# Patient Record
Sex: MALE | Race: WHITE | NOT HISPANIC OR LATINO | Employment: OTHER | URBAN - METROPOLITAN AREA
[De-identification: names, ages, dates, MRNs, and addresses within clinical notes are randomized per-mention and may not be internally consistent; named-entity substitution may affect disease eponyms.]

---

## 2019-06-05 ENCOUNTER — HOSPITAL ENCOUNTER (EMERGENCY)
Facility: OTHER | Age: 44
Discharge: HOME | End: 2019-06-05
Attending: EMERGENCY MEDICINE
Payer: COMMERCIAL

## 2019-06-05 ENCOUNTER — OFFICE VISIT (OUTPATIENT)
Dept: URGENT CARE | Facility: CLINIC | Age: 44
End: 2019-06-05
Payer: COMMERCIAL

## 2019-06-05 VITALS
DIASTOLIC BLOOD PRESSURE: 94 MMHG | HEIGHT: 72 IN | RESPIRATION RATE: 14 BRPM | TEMPERATURE: 98 F | HEART RATE: 66 BPM | WEIGHT: 235 LBS | SYSTOLIC BLOOD PRESSURE: 178 MMHG | OXYGEN SATURATION: 96 % | BODY MASS INDEX: 31.83 KG/M2

## 2019-06-05 VITALS
RESPIRATION RATE: 20 BRPM | HEIGHT: 72 IN | HEART RATE: 59 BPM | DIASTOLIC BLOOD PRESSURE: 96 MMHG | TEMPERATURE: 98 F | OXYGEN SATURATION: 98 % | WEIGHT: 235 LBS | SYSTOLIC BLOOD PRESSURE: 175 MMHG | BODY MASS INDEX: 31.83 KG/M2

## 2019-06-05 DIAGNOSIS — M54.9 BACK PAIN, UNSPECIFIED BACK LOCATION, UNSPECIFIED BACK PAIN LATERALITY, UNSPECIFIED CHRONICITY: Primary | ICD-10-CM

## 2019-06-05 DIAGNOSIS — R10.32 LEFT LOWER QUADRANT PAIN: ICD-10-CM

## 2019-06-05 DIAGNOSIS — M54.50 LEFT-SIDED LOW BACK PAIN WITHOUT SCIATICA, UNSPECIFIED CHRONICITY: Primary | ICD-10-CM

## 2019-06-05 LAB
ALBUMIN SERPL BCP-MCNC: 4.2 G/DL (ref 3.5–5.2)
ALP SERPL-CCNC: 73 U/L (ref 55–135)
ALT SERPL W/O P-5'-P-CCNC: 152 U/L (ref 10–44)
ANION GAP SERPL CALC-SCNC: 9 MMOL/L (ref 8–16)
AST SERPL-CCNC: 97 U/L (ref 10–40)
BASOPHILS # BLD AUTO: 0.03 K/UL (ref 0–0.2)
BASOPHILS NFR BLD: 0.6 % (ref 0–1.9)
BILIRUB SERPL-MCNC: 0.8 MG/DL (ref 0.1–1)
BILIRUB UR QL STRIP: NEGATIVE
BUN SERPL-MCNC: 10 MG/DL (ref 6–20)
CALCIUM SERPL-MCNC: 9.1 MG/DL (ref 8.7–10.5)
CHLORIDE SERPL-SCNC: 105 MMOL/L (ref 95–110)
CO2 SERPL-SCNC: 26 MMOL/L (ref 23–29)
CREAT SERPL-MCNC: 1 MG/DL (ref 0.5–1.4)
DIFFERENTIAL METHOD: ABNORMAL
EOSINOPHIL # BLD AUTO: 0.1 K/UL (ref 0–0.5)
EOSINOPHIL NFR BLD: 2.1 % (ref 0–8)
ERYTHROCYTE [DISTWIDTH] IN BLOOD BY AUTOMATED COUNT: 15.9 % (ref 11.5–14.5)
EST. GFR  (AFRICAN AMERICAN): >60 ML/MIN/1.73 M^2
EST. GFR  (NON AFRICAN AMERICAN): >60 ML/MIN/1.73 M^2
GLUCOSE SERPL-MCNC: 88 MG/DL (ref 70–110)
GLUCOSE UR QL STRIP: NEGATIVE
HCT VFR BLD AUTO: 47.3 % (ref 40–54)
HGB BLD-MCNC: 16.2 G/DL (ref 14–18)
KETONES UR QL STRIP: NEGATIVE
LEUKOCYTE ESTERASE UR QL STRIP: NEGATIVE
LIPASE SERPL-CCNC: 15 U/L (ref 4–60)
LYMPHOCYTES # BLD AUTO: 1.6 K/UL (ref 1–4.8)
LYMPHOCYTES NFR BLD: 31.4 % (ref 18–48)
MCH RBC QN AUTO: 30.9 PG (ref 27–31)
MCHC RBC AUTO-ENTMCNC: 34.2 G/DL (ref 32–36)
MCV RBC AUTO: 90 FL (ref 82–98)
MONOCYTES # BLD AUTO: 0.6 K/UL (ref 0.3–1)
MONOCYTES NFR BLD: 10.9 % (ref 4–15)
NEUTROPHILS # BLD AUTO: 2.9 K/UL (ref 1.8–7.7)
NEUTROPHILS NFR BLD: 54.8 % (ref 38–73)
PH, POC UA: 5.5
PLATELET # BLD AUTO: 137 K/UL (ref 150–350)
PMV BLD AUTO: 11.2 FL (ref 9.2–12.9)
POC BLOOD, URINE: NEGATIVE
POC NITRATES, URINE: NEGATIVE
POTASSIUM SERPL-SCNC: 4 MMOL/L (ref 3.5–5.1)
PROT SERPL-MCNC: 7.3 G/DL (ref 6–8.4)
PROT UR QL STRIP: NEGATIVE
RBC # BLD AUTO: 5.24 M/UL (ref 4.6–6.2)
SODIUM SERPL-SCNC: 140 MMOL/L (ref 136–145)
SP GR UR STRIP: 1.02 (ref 1–1.03)
UROBILINOGEN UR STRIP-ACNC: NORMAL (ref 0.3–2.2)
WBC # BLD AUTO: 5.23 K/UL (ref 3.9–12.7)

## 2019-06-05 PROCEDURE — 99284 EMERGENCY DEPT VISIT MOD MDM: CPT | Mod: 25

## 2019-06-05 PROCEDURE — 85025 COMPLETE CBC W/AUTO DIFF WBC: CPT

## 2019-06-05 PROCEDURE — 99203 OFFICE O/P NEW LOW 30 MIN: CPT | Mod: 25,S$GLB,, | Performed by: NURSE PRACTITIONER

## 2019-06-05 PROCEDURE — 96372 THER/PROPH/DIAG INJ SC/IM: CPT | Mod: S$GLB,,, | Performed by: EMERGENCY MEDICINE

## 2019-06-05 PROCEDURE — 96374 THER/PROPH/DIAG INJ IV PUSH: CPT

## 2019-06-05 PROCEDURE — 96361 HYDRATE IV INFUSION ADD-ON: CPT

## 2019-06-05 PROCEDURE — 96375 TX/PRO/DX INJ NEW DRUG ADDON: CPT

## 2019-06-05 PROCEDURE — 25000003 PHARM REV CODE 250: Performed by: PHYSICIAN ASSISTANT

## 2019-06-05 PROCEDURE — 83690 ASSAY OF LIPASE: CPT

## 2019-06-05 PROCEDURE — 63600175 PHARM REV CODE 636 W HCPCS: Performed by: PHYSICIAN ASSISTANT

## 2019-06-05 PROCEDURE — 96372 PR INJECTION,THERAP/PROPH/DIAG2ST, IM OR SUBCUT: ICD-10-PCS | Mod: S$GLB,,, | Performed by: EMERGENCY MEDICINE

## 2019-06-05 PROCEDURE — 80053 COMPREHEN METABOLIC PANEL: CPT

## 2019-06-05 PROCEDURE — 74019 RADEX ABDOMEN 2 VIEWS: CPT | Mod: FY,S$GLB,, | Performed by: RADIOLOGY

## 2019-06-05 PROCEDURE — 81003 URINALYSIS AUTO W/O SCOPE: CPT | Mod: QW,S$GLB,, | Performed by: NURSE PRACTITIONER

## 2019-06-05 PROCEDURE — 81003 POCT URINALYSIS, DIPSTICK, AUTOMATED, W/O SCOPE: ICD-10-PCS | Mod: QW,S$GLB,, | Performed by: NURSE PRACTITIONER

## 2019-06-05 PROCEDURE — 99203 PR OFFICE/OUTPT VISIT, NEW, LEVL III, 30-44 MIN: ICD-10-PCS | Mod: 25,S$GLB,, | Performed by: NURSE PRACTITIONER

## 2019-06-05 PROCEDURE — 25500020 PHARM REV CODE 255: Performed by: EMERGENCY MEDICINE

## 2019-06-05 PROCEDURE — 74019 XR ABDOMEN FLAT AND ERECT: ICD-10-PCS | Mod: FY,S$GLB,, | Performed by: RADIOLOGY

## 2019-06-05 RX ORDER — KETOROLAC TROMETHAMINE 30 MG/ML
30 INJECTION, SOLUTION INTRAMUSCULAR; INTRAVENOUS
Status: COMPLETED | OUTPATIENT
Start: 2019-06-05 | End: 2019-06-05

## 2019-06-05 RX ORDER — DIPHENHYDRAMINE HYDROCHLORIDE 50 MG/ML
50 INJECTION INTRAMUSCULAR; INTRAVENOUS
Status: COMPLETED | OUTPATIENT
Start: 2019-06-05 | End: 2019-06-05

## 2019-06-05 RX ORDER — ONDANSETRON 4 MG/1
4 TABLET, ORALLY DISINTEGRATING ORAL EVERY 6 HOURS PRN
Qty: 15 TABLET | Refills: 0 | Status: SHIPPED | OUTPATIENT
Start: 2019-06-05

## 2019-06-05 RX ORDER — CERTOLIZUMAB PEGOL 200 MG/ML
INJECTION, SOLUTION SUBCUTANEOUS
COMMUNITY
Start: 2019-06-04

## 2019-06-05 RX ORDER — NAPROXEN 500 MG/1
500 TABLET ORAL 2 TIMES DAILY WITH MEALS
Qty: 20 TABLET | Refills: 0 | Status: SHIPPED | OUTPATIENT
Start: 2019-06-05

## 2019-06-05 RX ORDER — ESCITALOPRAM OXALATE 10 MG/1
TABLET ORAL
Refills: 3 | COMMUNITY
Start: 2019-05-18

## 2019-06-05 RX ORDER — NEBIVOLOL HYDROCHLORIDE 5 MG/1
TABLET ORAL
Refills: 0 | COMMUNITY
Start: 2019-05-09

## 2019-06-05 RX ORDER — ONDANSETRON 2 MG/ML
4 INJECTION INTRAMUSCULAR; INTRAVENOUS
Status: COMPLETED | OUTPATIENT
Start: 2019-06-05 | End: 2019-06-05

## 2019-06-05 RX ORDER — TELMISARTAN 80 MG/1
TABLET ORAL
Refills: 3 | COMMUNITY
Start: 2019-04-01

## 2019-06-05 RX ADMIN — ONDANSETRON 4 MG: 2 INJECTION INTRAMUSCULAR; INTRAVENOUS at 01:06

## 2019-06-05 RX ADMIN — KETOROLAC TROMETHAMINE 30 MG: 30 INJECTION, SOLUTION INTRAMUSCULAR; INTRAVENOUS at 12:06

## 2019-06-05 RX ADMIN — IOHEXOL 100 ML: 350 INJECTION, SOLUTION INTRAVENOUS at 03:06

## 2019-06-05 RX ADMIN — DIPHENHYDRAMINE HYDROCHLORIDE 50 MG: 50 INJECTION, SOLUTION INTRAMUSCULAR; INTRAVENOUS at 02:06

## 2019-06-05 RX ADMIN — SODIUM CHLORIDE 1000 ML: 0.9 INJECTION, SOLUTION INTRAVENOUS at 01:06

## 2019-06-05 NOTE — ED TRIAGE NOTES
Pt presents with c/o flank pain, left sided onset yesterday. Pt seen at  today, sent for CT. Per NP Eloise urine and xray negative. Pt denies fever/chills, dysuria or hematuria, SOB, or chest pain. Pt skin warm dry and intact. Pt AAOx4, RR even and unlabored, NAD noted.  Pt placed on bp and pulse ox monitors. Bed locked and in lowest position, side rails up x2, and call light within reach.

## 2019-06-05 NOTE — PROGRESS NOTES
Subjective:       Patient ID: Niranjan Borrego is a 44 y.o. male.    Vitals:  height is 6' (1.829 m) and weight is 106.6 kg (235 lb). His temperature is 98.2 °F (36.8 °C). His blood pressure is 175/96 (abnormal) and his pulse is 59 (abnormal). His respiration is 20 and oxygen saturation is 98%.     Chief Complaint: Flank Pain (Left)    Patient from [place], here for left flank pain and nausea. Symptoms started yesterday morning. Has taken b.c and ibuprofen for symptoms with no relief. Denies any medication for pain today.     No Hx of renal disease, kidney stone, trauma or injury.  No recent illness or fever.  No Hx of diverticulitis or other GI related issues. No Hx of abdominal surgeries.    Patient has pertinent Hx of RA and is on immunosuppressive/biologic (CIMZIA).    Flank Pain   This is a new problem. The current episode started yesterday. The problem occurs constantly. The problem has been gradually worsening since onset. The pain is present in the lumbar spine. The quality of the pain is described as aching. The pain does not radiate. The pain is at a severity of 8/10. The pain is mild. Pertinent negatives include no abdominal pain, bladder incontinence, bowel incontinence, chest pain, dysuria, fever, headaches, leg pain, numbness, paresis, paresthesias, pelvic pain, perianal numbness, tingling, weakness or weight loss. He has tried NSAIDs for the symptoms. The treatment provided no relief.       Constitution: Negative for chills, fatigue and fever.   HENT: Negative for congestion and sore throat.    Neck: Negative for painful lymph nodes.   Cardiovascular: Negative for chest pain and leg swelling.   Eyes: Negative for double vision and blurred vision.   Respiratory: Negative for cough and shortness of breath.    Gastrointestinal: Negative for abdominal pain, nausea, vomiting, diarrhea and bowel incontinence.   Genitourinary: Positive for flank pain. Negative for dysuria, frequency, urgency, bladder incontinence  and pelvic pain.   Musculoskeletal: Negative for joint pain, joint swelling, muscle cramps and muscle ache.   Skin: Negative for color change, pale and rash.   Allergic/Immunologic: Negative for seasonal allergies.   Neurological: Negative for dizziness, history of vertigo, light-headedness, passing out, headaches and numbness.   Hematologic/Lymphatic: Negative for swollen lymph nodes, easy bruising/bleeding and history of blood clots. Does not bruise/bleed easily.   Psychiatric/Behavioral: Negative for nervous/anxious, sleep disturbance and depression. The patient is not nervous/anxious.        Objective:      Physical Exam   Constitutional: He is oriented to person, place, and time. He appears well-developed and well-nourished.  Non-toxic appearance. He appears ill.   Appears uncomfortable   HENT:   Head: Normocephalic and atraumatic.   Right Ear: External ear normal.   Left Ear: External ear normal.   Nose: Nose normal.   Mouth/Throat: Mucous membranes are normal.   Eyes: Conjunctivae and lids are normal.   Neck: Trachea normal and full passive range of motion without pain. Neck supple.   Cardiovascular: Normal rate, regular rhythm and normal heart sounds.   Pulmonary/Chest: Effort normal and breath sounds normal. No respiratory distress.   Abdominal: Soft. Normal appearance and bowel sounds are normal. He exhibits no distension, no abdominal bruit, no pulsatile midline mass and no mass. There is no hepatosplenomegaly. There is tenderness in the left lower quadrant. There is no rigidity, no rebound and no guarding.       Markle Sign: POS (LLQ)/left flank   Musculoskeletal: Normal range of motion. He exhibits no edema.        Lumbar back: He exhibits tenderness.        Back:    Neurological: He is alert and oriented to person, place, and time. He has normal strength.   Skin: Skin is warm, dry and intact. Lesion noted. He is not diaphoretic. No pallor.        Psychiatric: He has a normal mood and affect. His speech  is normal and behavior is normal. Judgment and thought content normal. Cognition and memory are normal.   Nursing note and vitals reviewed.      Results for orders placed or performed in visit on 06/05/19   POCT Urinalysis, Dipstick, Automated, W/O Scope   Result Value Ref Range    POC Blood, Urine Negative Negative    POC Bilirubin, Urine Negative Negative    POC Urobilinogen, Urine Normal 0.3 - 2.2    POC Ketones, Urine Negative Negative    POC Protein, Urine Negative Negative    POC Nitrates, Urine Negative Negative    POC Glucose, Urine Negative Negative    pH, UA 5.5     POC Specific Gravity, Urine 1.020 1.003 - 1.029    POC Leukocytes, Urine Negative Negative       X-ray Abdomen Flat And Erect    Result Date: 6/5/2019  EXAMINATION: XR ABDOMEN FLAT AND ERECT CLINICAL HISTORY: Dorsalgia, unspecified TECHNIQUE: Flat and erect AP views of the abdomen were performed. COMPARISON: None FINDINGS: No free air in the abdomen.  No significant bowel dilatation identified.  No significant intra-abdominal calcifications.     See above Electronically signed by: Raleigh Sainz MD Date:    06/05/2019 Time:    11:38    Some relief in pain after Toradol, but patient still has significant pain.    Assessment:       1. Back pain, unspecified back location, unspecified back pain laterality, unspecified chronicity    2. Left lower quadrant pain        Plan:       I discussed this patient's disposition with Dr. Awan, the medical director.  Given his pain and min relief after Toradol, I advised the patient he needs further imaging (likely CT scan).    Differentials include new onset diverticulitis, vs renal lithiasis vs AAA.    Patient declined all offers of Ambulance transport in lieu of driving himself.     Back pain, unspecified back location, unspecified back pain laterality, unspecified chronicity  -     POCT Urinalysis, Dipstick, Automated, W/O Scope  -     X-Ray Abdomen Flat And Erect; Future; Expected date: 06/05/2019  -      ketorolac injection 30 mg  -     Refer to Emergency Dept.    Left lower quadrant pain  -     Refer to Emergency Dept.      Patient Instructions     A referral was placed to the Emergency Department. Please go immediately to the Emergency Department as discussed.    Uncertain Causes of Abdominal Pain (Male)  Based on your visit today, the exact cause of your abdominal pain is not clear. Your exam and tests do not suggest a dangerous cause at this time. However, the signs of a serious problem may take more time to appear. Although your evaluation was reassuring today, sometimes early in the course of many conditions, exam and lab tests can appear normal. Therefore, it is important for you to watch for any new symptoms or worsening of your condition.  It may not be obvious what caused your symptoms. Pay attention to things that do seem to make your symptoms worse or better and discuss this with your doctor when you follow up.  The evaluation of abdominal pain in the emergency department may only require an exam by the doctor or it may include blood, urine or imaging studies, depending on many factors. Sometimes exams and tests can identify a cause but in many cases, a clear cause is not found. Further testing at follow up visits may help to suggest a clear diagnosis.  Home care  · Rest as much as you can until your next exam.  · Try to avoid any medicines (unless otherwise directed by your doctor), foods, activities, or other factors that may have contributed to your symptoms.  · Try to eat foods that you know that you have tolerated well in the past. Certain diets may be recommended for some conditions that cause abdominal pain. However, since the cause of your symptoms may not be clear, discuss your diet more with your healthcare provider or specialist for further recommendations.   · If you have diarrhea, it may help to avoid dairy (lactose) for the time being. A low fat, low fiber diet can also help.  · Eating  several small meals per day as opposed to 2 or 3 larger meals may help.  · Avoid dehydration. Make sure to drink plenty of water. Other options include broth, soup, gelatin, sports drinks, or other clear liquids.  · Watch closely for anything that may make your symptoms worse or better. Pay close attention to symptoms below that may mean your condition is getting worse.  Follow-up care  Follow up with your healthcare provider if your symptoms are not improving, or as advised. In some cases, you may need more testing.  When to seek medical advice  Call your healthcare provider right away if any of these occur:  · Pain is becoming worse  · You are unable to take your medicines or can't keep water down due to excessive vomiting  · Swelling of the abdomen  · Fever of 100.4ºF (38ºC) or higher, or as directed by your healthcare provider  · Blood in vomit or bowel movements (dark red or black color)  · Jaundice (yellow color of eyes and skin)  · New onset of weakness, dizziness or fainting  · New onset of chest, arm, back, neck or jaw pain  Date Last Reviewed: 12/30/2015  © 2735-8068 [employer]. [address] [place] [address]. All rights reserved. This information is not intended as a substitute for professional medical care. Always follow your healthcare professional's instructions.

## 2019-06-05 NOTE — PATIENT INSTRUCTIONS
A referral was placed to the Emergency Department. Please go immediately to the Emergency Department as discussed.    Uncertain Causes of Abdominal Pain (Male)  Based on your visit today, the exact cause of your abdominal pain is not clear. Your exam and tests do not suggest a dangerous cause at this time. However, the signs of a serious problem may take more time to appear. Although your evaluation was reassuring today, sometimes early in the course of many conditions, exam and lab tests can appear normal. Therefore, it is important for you to watch for any new symptoms or worsening of your condition.  It may not be obvious what caused your symptoms. Pay attention to things that do seem to make your symptoms worse or better and discuss this with your doctor when you follow up.  The evaluation of abdominal pain in the emergency department may only require an exam by the doctor or it may include blood, urine or imaging studies, depending on many factors. Sometimes exams and tests can identify a cause but in many cases, a clear cause is not found. Further testing at follow up visits may help to suggest a clear diagnosis.  Home care  · Rest as much as you can until your next exam.  · Try to avoid any medicines (unless otherwise directed by your doctor), foods, activities, or other factors that may have contributed to your symptoms.  · Try to eat foods that you know that you have tolerated well in the past. Certain diets may be recommended for some conditions that cause abdominal pain. However, since the cause of your symptoms may not be clear, discuss your diet more with your healthcare provider or specialist for further recommendations.   · If you have diarrhea, it may help to avoid dairy (lactose) for the time being. A low fat, low fiber diet can also help.  · Eating several small meals per day as opposed to 2 or 3 larger meals may help.  · Avoid dehydration. Make sure to drink plenty of water. Other options  include broth, soup, gelatin, sports drinks, or other clear liquids.  · Watch closely for anything that may make your symptoms worse or better. Pay close attention to symptoms below that may mean your condition is getting worse.  Follow-up care  Follow up with your healthcare provider if your symptoms are not improving, or as advised. In some cases, you may need more testing.  When to seek medical advice  Call your healthcare provider right away if any of these occur:  · Pain is becoming worse  · You are unable to take your medicines or can't keep water down due to excessive vomiting  · Swelling of the abdomen  · Fever of 100.4ºF (38ºC) or higher, or as directed by your healthcare provider  · Blood in vomit or bowel movements (dark red or black color)  · Jaundice (yellow color of eyes and skin)  · New onset of weakness, dizziness or fainting  · New onset of chest, arm, back, neck or jaw pain  Date Last Reviewed: 12/30/2015  © 5498-0226 [employer]. [address] [place] [address], [place] [address]. All rights reserved. This information is not intended as a substitute for professional medical care. Always follow your healthcare professional's instructions.

## 2019-06-05 NOTE — ED PROVIDER NOTES
Encounter Date: 6/5/2019       History     Chief Complaint   Patient presents with    Flank Pain     Pt reports constant left sided flank pain since yesterday morning, sent from urgent care for CT.     Patient is a 44-year-old male with a past medical history of rheumatoid arthritis, hypertension, presenting to the emergency room with complaints of left back and abdominal pain.  The patient states his symptoms started yesterday morning and have progressively worsened.  He admits that the pain is constant.  He states that is most significant in his back and radiates into his abdomen.  He reports associated nausea from the pain but denies any vomiting. No diarrhea, hematuria, dysuria.  He admits that the pain brought him to his knees earlier today.  He states he is visiting from out of town.  He admits he went to an urgent care prior to arrival where he got a shot of Toradol and x-ray.  He also states his urine was checked.  He states that they initially told it might be kidney stone however did not find any signs or symptoms of this.  They recommended he come to the ED for a CT scan and further evaluation.  No fever chills. No prior episode.This is the extent of the patient's complaints at this time.       The history is provided by the patient.     Review of patient's allergies indicates:   Allergen Reactions    Iodine and iodide containing products Rash     Past Medical History:   Diagnosis Date    Hypertension     Rheumatoid arthritis      Past Surgical History:   Procedure Laterality Date    SPINE SURGERY      TONSILLECTOMY       History reviewed. No pertinent family history.  Social History     Tobacco Use    Smoking status: Current Some Day Smoker     Types: Cigarettes   Substance Use Topics    Alcohol use: Yes    Drug use: Never     Review of Systems   Constitutional: Negative for activity change, chills, fatigue and fever.   HENT: Negative for congestion, rhinorrhea and sore throat.    Eyes: Negative  for photophobia and visual disturbance.   Respiratory: Negative for cough and shortness of breath.    Cardiovascular: Negative for chest pain.   Gastrointestinal: Positive for abdominal pain. Negative for diarrhea, nausea and vomiting.   Genitourinary: Positive for flank pain. Negative for dysuria, hematuria and urgency.   Musculoskeletal: Positive for back pain. Negative for myalgias and neck pain.   Skin: Negative for color change and wound.   Neurological: Negative for weakness and headaches.   Psychiatric/Behavioral: Negative for agitation and confusion.       Physical Exam     Initial Vitals   BP Pulse Resp Temp SpO2   06/05/19 1337 06/05/19 1338 06/05/19 1541 -- 06/05/19 1338   (!) 177/95 62 14  96 %      MAP       --                Physical Exam    Nursing note and vitals reviewed.  Constitutional: Vital signs are normal. He appears well-developed and well-nourished. He is not diaphoretic.  Non-toxic appearance. He does not have a sickly appearance. He does not appear ill. No distress.   Uncomfortable appearing  male accompanied the emergency room.  Speaking clearly full sentences.  No acute distress.   HENT:   Head: Normocephalic and atraumatic.   Right Ear: External ear normal.   Left Ear: External ear normal.   Nose: Nose normal.   Mouth/Throat: Oropharynx is clear and moist.   Eyes: Conjunctivae and EOM are normal.   Neck: Normal range of motion. Neck supple.   Cardiovascular: Normal rate, regular rhythm and normal heart sounds.   Pulmonary/Chest: Breath sounds normal. No respiratory distress. He has no wheezes.   Abdominal: Soft. Bowel sounds are normal. He exhibits no distension. There is tenderness in the left lower quadrant. There is no rebound, no guarding and no CVA tenderness.   Musculoskeletal: Normal range of motion.   Tenderness to palpation of the lumbar spine at the left-sided paraspinal muscles with known midline tenderness, crepitus, step-off.  No palpable deformity   Neurological:  He is alert and oriented to person, place, and time. GCS eye subscore is 4. GCS verbal subscore is 5. GCS motor subscore is 6.   Skin: Skin is warm.   Psychiatric: He has a normal mood and affect. His behavior is normal. Judgment and thought content normal.         ED Course   Procedures  Labs Reviewed   CBC W/ AUTO DIFFERENTIAL - Abnormal; Notable for the following components:       Result Value    RDW 15.9 (*)     Platelets 137 (*)     All other components within normal limits   COMPREHENSIVE METABOLIC PANEL - Abnormal; Notable for the following components:    AST 97 (*)      (*)     All other components within normal limits   LIPASE          Imaging Results          CT Abdomen Pelvis With Contrast (Final result)  Result time 06/05/19 15:45:21    Final result by Nigel Gordon MD (06/05/19 15:45:21)                 Impression:      1. No acute process or CT findings identified to explain patient's symptoms of left lower quadrant pain.  2. Minimal colonic diverticulosis without focal diverticulitis.  3. Hepatosplenomegaly and hepatic steatosis.  4. Few additional findings as above.      Electronically signed by: Nigel Gordon MD  Date:    06/05/2019  Time:    15:45             Narrative:    EXAMINATION:  CT ABDOMEN PELVIS WITH CONTRAST    CLINICAL HISTORY:  LLQ pain, suspect diverticulitis;    TECHNIQUE:  Low dose axial images, sagittal and coronal reformations were obtained from the lung bases to the pubic symphysis following the IV administration of 100 mL of Omnipaque 350 .  Oral contrast was not given.    COMPARISON:  Abdominal series earlier same day    FINDINGS:  Imaged lung bases show mild dependent atelectasis, minimal platelike scarring versus atelectasis in the left lower lobe, and right hilar small calcifications likely sequela of prior granulomatous disease.  Base of the heart is within normal limits.    Liver is mildly enlarged with diffuse parenchymal hypoattenuation consistent with fatty  infiltration, without focal process seen.  Gallbladder, pancreas, stomach, duodenum and bilateral adrenal glands are within normal limits.  Spleen is enlarged without focal process seen.  No biliary ductal dilatation.    Bilateral kidneys are normal in size, shape and location with relatively symmetric enhancement.  No hydronephrosis or significant perinephric stranding.  Urinary bladder is within normal limits.  Prostate is normal in size containing coarse calcifications within the central gland.  Few scattered punctate pelvic phleboliths noted.    Small fat containing umbilical hernia.  Small fat containing bilateral inguinal hernias, left greater than right.  Appendix and terminal ileum are within normal limits.  Few scattered diverticula of the descending and proximal sigmoid colon without focal diverticulitis seen.  No evidence of bowel obstruction or inflammation.  No pneumatosis or portal venous gas.    No ascites, free air or lymphadenopathy.  Minimal scattered atherosclerosis of the aorta and its proximal branch vessels.  Aorta tapers normally.    Osseous structures show age-related mild degenerative change and chronic appearing mild anterior wedge deformity of several lower thoracic vertebral bodies, without acute or destructive process seen.                                 Medical Decision Making:   Initial Assessment:     Urgent evaluation a 44-year-old male presenting to the emergency room with complaints of left low back and abdominal pain x2 days.  Patient is afebrile, nontoxic appearing, hemodynamically stable. Physical exam reveals mild tenderness to palpation left lower quadrant abdomen as well as the left-sided paraspinal muscles lumbar spine.  He patient had a negative UA earlier today as well as a negative KUB.  Will plan for imaging, labs and reassess.      Clinical Tests:   Lab Tests: Ordered and Reviewed  Radiological Study: Ordered and Reviewed  ED Management:    CBC shows no leukocytosis,  normal H&H.  CMP shows mildly elevated LFTs including a normal alk phos, AST is 97, and ALT of 152.  Total bili is normal.  Lipase is normal.  CT scan does not show any signs or symptoms to explain the patient's pain. On reassessment, patient does report some improvement of his symptoms. At this time, do not feel any further testing imaging is warranted.  He will be discharged home with a prescription for Zofran naproxen, urged to obtain follow-up when he returns home with his PCP.  Discharged home in stable condition.The patient was instructed to follow up with a primary care provider in 2 days or to return to the emergency department for worsening symptoms. The treatment plan was discussed with the patient who demonstrated understanding and comfort with plan. The patient's history, physical exam, and plan of care was discussed with and agreed upon with my supervising physician.     This note was created using [employer] Fluency Direct. There may be typographical errors secondary to dictation.                         Clinical Impression:     1. Left-sided low back pain without sciatica, unspecified chronicity    2. Left lower quadrant pain           Disposition:   Disposition: Discharged  Condition: Stable                        Vandana Hernandez PA-C  06/05/19 [id number]

## 2019-06-05 NOTE — ED NOTES
Pt rounding complete.  Pain 5/10, not requesting medication at this time, respirations even and unlabored, NAD.  Restroom and comfort needs addressed.  Pt updated on plan of care.  Call light within reach.  Will continue to monitor.